# Patient Record
Sex: FEMALE | Race: WHITE
[De-identification: names, ages, dates, MRNs, and addresses within clinical notes are randomized per-mention and may not be internally consistent; named-entity substitution may affect disease eponyms.]

---

## 2020-09-10 ENCOUNTER — HOSPITAL ENCOUNTER (OUTPATIENT)
Dept: HOSPITAL 35 - CAT | Age: 55
End: 2020-09-10
Attending: INTERNAL MEDICINE
Payer: COMMERCIAL

## 2020-09-10 DIAGNOSIS — I25.10: ICD-10-CM

## 2020-09-10 DIAGNOSIS — Z13.6: Primary | ICD-10-CM

## 2020-09-10 DIAGNOSIS — E78.00: ICD-10-CM

## 2020-09-24 ENCOUNTER — HOSPITAL ENCOUNTER (OUTPATIENT)
Dept: HOSPITAL 35 - SJCVCIMAG | Age: 55
End: 2020-09-24
Attending: INTERNAL MEDICINE
Payer: COMMERCIAL

## 2020-09-24 DIAGNOSIS — I08.3: Primary | ICD-10-CM

## 2020-09-24 DIAGNOSIS — I48.0: ICD-10-CM

## 2020-09-24 DIAGNOSIS — Z82.49: ICD-10-CM

## 2020-10-12 ENCOUNTER — HOSPITAL ENCOUNTER (OUTPATIENT)
Dept: HOSPITAL 35 - LAB | Age: 55
End: 2020-10-12
Attending: INTERNAL MEDICINE
Payer: COMMERCIAL

## 2020-10-12 ENCOUNTER — HOSPITAL ENCOUNTER (OUTPATIENT)
Dept: HOSPITAL 35 - CAT | Age: 55
End: 2020-10-12
Attending: INTERNAL MEDICINE
Payer: COMMERCIAL

## 2020-10-12 DIAGNOSIS — I48.91: Primary | ICD-10-CM

## 2020-10-12 DIAGNOSIS — Z20.828: ICD-10-CM

## 2020-10-12 DIAGNOSIS — Z01.812: Primary | ICD-10-CM

## 2020-10-12 LAB
ALBUMIN SERPL-MCNC: 4.3 G/DL (ref 3.4–5)
ALT SERPL-CCNC: 24 U/L (ref 30–65)
ANION GAP SERPL CALC-SCNC: 10 MMOL/L (ref 7–16)
AST SERPL-CCNC: 19 U/L (ref 15–37)
BASOPHILS NFR BLD AUTO: 1.1 % (ref 0–2)
BILIRUB SERPL-MCNC: 0.6 MG/DL (ref 0.2–1)
BUN SERPL-MCNC: 14 MG/DL (ref 7–18)
CALCIUM SERPL-MCNC: 9.7 MG/DL (ref 8.5–10.1)
CHLORIDE SERPL-SCNC: 105 MMOL/L (ref 98–107)
CO2 SERPL-SCNC: 26 MMOL/L (ref 21–32)
CREAT SERPL-MCNC: 0.6 MG/DL (ref 0.6–1)
EOSINOPHIL NFR BLD: 2.8 % (ref 0–3)
ERYTHROCYTE [DISTWIDTH] IN BLOOD BY AUTOMATED COUNT: 13.3 % (ref 10.5–14.5)
GLUCOSE SERPL-MCNC: 95 MG/DL (ref 74–106)
GRANULOCYTES NFR BLD MANUAL: 50.7 % (ref 36–66)
HCT VFR BLD CALC: 40.9 % (ref 37–47)
HGB BLD-MCNC: 13.5 GM/DL (ref 12–15)
LYMPHOCYTES NFR BLD AUTO: 34.4 % (ref 24–44)
MCH RBC QN AUTO: 27.9 PG (ref 26–34)
MCHC RBC AUTO-ENTMCNC: 33 G/DL (ref 28–37)
MCV RBC: 84.6 FL (ref 80–100)
MONOCYTES NFR BLD: 11 % (ref 1–8)
NEUTROPHILS # BLD: 2.5 THOU/UL (ref 1.4–8.2)
PLATELET # BLD: 259 THOU/UL (ref 150–400)
POTASSIUM SERPL-SCNC: 4.2 MMOL/L (ref 3.5–5.1)
PROT SERPL-MCNC: 8 G/DL (ref 6.4–8.2)
RBC # BLD AUTO: 4.83 MIL/UL (ref 4.2–5)
SODIUM SERPL-SCNC: 141 MMOL/L (ref 136–145)
WBC # BLD AUTO: 4.9 THOU/UL (ref 4–11)

## 2020-10-16 ENCOUNTER — HOSPITAL ENCOUNTER (OUTPATIENT)
Dept: HOSPITAL 35 - CATH | Age: 55
LOS: 1 days | Discharge: HOME | End: 2020-10-17
Attending: INTERNAL MEDICINE
Payer: COMMERCIAL

## 2020-10-16 VITALS — DIASTOLIC BLOOD PRESSURE: 60 MMHG | SYSTOLIC BLOOD PRESSURE: 126 MMHG

## 2020-10-16 VITALS — SYSTOLIC BLOOD PRESSURE: 116 MMHG | DIASTOLIC BLOOD PRESSURE: 65 MMHG

## 2020-10-16 VITALS — DIASTOLIC BLOOD PRESSURE: 52 MMHG | SYSTOLIC BLOOD PRESSURE: 100 MMHG

## 2020-10-16 VITALS — DIASTOLIC BLOOD PRESSURE: 58 MMHG | SYSTOLIC BLOOD PRESSURE: 112 MMHG

## 2020-10-16 VITALS — DIASTOLIC BLOOD PRESSURE: 59 MMHG | SYSTOLIC BLOOD PRESSURE: 113 MMHG

## 2020-10-16 VITALS — DIASTOLIC BLOOD PRESSURE: 59 MMHG | SYSTOLIC BLOOD PRESSURE: 121 MMHG

## 2020-10-16 VITALS — DIASTOLIC BLOOD PRESSURE: 58 MMHG | SYSTOLIC BLOOD PRESSURE: 116 MMHG

## 2020-10-16 VITALS — WEIGHT: 225 LBS | HEIGHT: 68 IN | BODY MASS INDEX: 34.1 KG/M2

## 2020-10-16 VITALS — DIASTOLIC BLOOD PRESSURE: 68 MMHG | SYSTOLIC BLOOD PRESSURE: 98 MMHG

## 2020-10-16 DIAGNOSIS — Z88.0: ICD-10-CM

## 2020-10-16 DIAGNOSIS — Z79.01: ICD-10-CM

## 2020-10-16 DIAGNOSIS — Z98.890: ICD-10-CM

## 2020-10-16 DIAGNOSIS — E05.00: ICD-10-CM

## 2020-10-16 DIAGNOSIS — Z79.899: ICD-10-CM

## 2020-10-16 DIAGNOSIS — I48.91: Primary | ICD-10-CM

## 2020-10-16 DIAGNOSIS — Z88.2: ICD-10-CM

## 2020-10-16 LAB
ALBUMIN SERPL-MCNC: 4.3 G/DL (ref 3.4–5)
ALT SERPL-CCNC: 23 U/L (ref 30–65)
ANION GAP SERPL CALC-SCNC: 10 MMOL/L (ref 7–16)
APTT BLD: 26.8 SECONDS (ref 24.5–32.8)
AST SERPL-CCNC: 23 U/L (ref 15–37)
BILIRUB SERPL-MCNC: 0.9 MG/DL (ref 0.2–1)
BUN SERPL-MCNC: 13 MG/DL (ref 7–18)
CALCIUM SERPL-MCNC: 9.8 MG/DL (ref 8.5–10.1)
CHLORIDE SERPL-SCNC: 103 MMOL/L (ref 98–107)
CO2 SERPL-SCNC: 28 MMOL/L (ref 21–32)
CREAT SERPL-MCNC: 0.8 MG/DL (ref 0.6–1)
EOSINOPHIL NFR BLD: 3 % (ref 0–3)
ERYTHROCYTE [DISTWIDTH] IN BLOOD BY AUTOMATED COUNT: 13.2 % (ref 10.5–14.5)
GLUCOSE SERPL-MCNC: 98 MG/DL (ref 74–106)
GRANULOCYTES NFR BLD MANUAL: 52 % (ref 36–66)
HCT VFR BLD CALC: 41.5 % (ref 37–47)
HGB BLD-MCNC: 13.9 GM/DL (ref 12–15)
INR PPP: 1
LYMPHOCYTES NFR BLD AUTO: 35 % (ref 24–44)
MCH RBC QN AUTO: 28 PG (ref 26–34)
MCHC RBC AUTO-ENTMCNC: 33.5 G/DL (ref 28–37)
MCV RBC: 83.8 FL (ref 80–100)
MONOCYTES NFR BLD: 10 % (ref 1–8)
NEUTROPHILS # BLD: 2.7 THOU/UL (ref 1.4–8.2)
PLATELET # BLD: 276 THOU/UL (ref 150–400)
POTASSIUM SERPL-SCNC: 4.2 MMOL/L (ref 3.5–5.1)
PROT SERPL-MCNC: 7.9 G/DL (ref 6.4–8.2)
PROTHROMBIN TIME: 10.4 SECONDS (ref 9.3–11.4)
RBC # BLD AUTO: 4.95 MIL/UL (ref 4.2–5)
SODIUM SERPL-SCNC: 141 MMOL/L (ref 136–145)
WBC # BLD AUTO: 5.2 THOU/UL (ref 4–11)

## 2020-10-16 PROCEDURE — 65020: CPT

## 2020-10-16 PROCEDURE — 70005: CPT

## 2020-10-16 PROCEDURE — 65040: CPT

## 2020-10-16 PROCEDURE — 62900: CPT

## 2020-10-16 PROCEDURE — 62110: CPT

## 2020-10-16 PROCEDURE — 10797: CPT

## 2020-10-16 NOTE — NUR
PT. ARRIVED AT FLOOR AROUND 1515; PT. AOX4; VS WNL; SR ON THE MONITOR;
EDUCATED ABOUT BED REST AND MANTAINING LEG STRAIGH; ST. UNDERSTANDING; C/O
PAIN OVER R. GROIN INCISION; PRN PAIN MEDICATION GIVEN; RE-ASSESSMENT PT.
RESTING WITH EYES CLOSED; THROUGH THE AFTERNOON NO HEMATOMA NOTICED OVER R.
GROIN SIDE; EDUCATED ABOUT CALLING IF FEELS PAIN OR NOTICED SOME SWELLING; ST.
UNDERSTANDING; PER DR. ARTEAGA D/C LOCKED AFTER THREE HOURS; BED REST UNTIL
2030; [facility]. UNDERSTANDING; MONITORING; ADMISSION PERFORMED; ASSESSMENT AS
CHARGED; FOLLOWING POC; WILL PASS ON REPORT;

## 2020-10-17 VITALS — SYSTOLIC BLOOD PRESSURE: 113 MMHG | DIASTOLIC BLOOD PRESSURE: 56 MMHG

## 2020-10-17 VITALS — DIASTOLIC BLOOD PRESSURE: 59 MMHG | SYSTOLIC BLOOD PRESSURE: 119 MMHG

## 2020-10-17 VITALS — DIASTOLIC BLOOD PRESSURE: 56 MMHG | SYSTOLIC BLOOD PRESSURE: 113 MMHG

## 2020-10-19 NOTE — P
[facility]
[address]
[place]   [address]                     PROCEDURE REPORT              
_______________________________________________________________________________
 
Name:       BENIGNO DEAN             Room #:                     DEP [facility] 
JENNIFER#:      [id number]                       Account #:      [id number]  
Admission:  10/16/20    Attend Phys:    Armando Epstein MD
Discharge:  10/17/20    Date of Birth:  [birth year]  
                                                          Report #: [id number]
                                                                    [id number]   
_______________________________________________________________________________
THIS REPORT FOR:  
 
cc:  Nba Merlos MD, Douglas MD Couchonnal,Armando GRAYSON MD                                        ~
CC: Nba Epstein
 
DATE OF SERVICE:  10/16/2020
 
 
PREOPERATIVE DIAGNOSIS:  Atrial fibrillation.
 
POSTOPERATIVE DIAGNOSIS:  Atrial fibrillation.
 
PROCEDURES PERFORMED:
1.  Atrial fibrillation ablation, CPT code 36270.
2.  3D mapping, CPT code 24026.
3.  Intracardiac echo, CPT code 54600.
 
HISTORY:  The patient is a 55-year-old with history of recurrent paroxysmal
atrial fibrillation, here for ablation.
 
ANESTHESIA:  The patient underwent general anesthesia with no anesthesia related
complications.
 
DESCRIPTION OF PROCEDURE:  The patient underwent informed consent.  We discussed
the details of the procedure including the risks, which include but not limited
to bleeding, infection, vascular damage, stroke, MI as well as cardiac
perforation.  She understood these risks and is willing to proceed.
 
The patient was brought to EP laboratory in fasting and sedated state, prepped
and draped in a sterile fashion.  I obtained access to the right femoral vein x
3, placing an 8, 9 and 7-Thai short sheath using the modified Seldinger
technique.  Next, under fluoroscopy, decapolar catheter was placed easily in the
coronary sinus and ICE catheter was placed in the right atrium.  Using
intracardiac ultrasound, I created a 3D geometry of the left atrium.  The
patient had evidence of two left and two right pulmonary veins.  Her cardiac CT
scan showed that the right inferior pulmonary vein had 3 branches, which likely
contributed to the isolation of this vein being difficult.  Next, the patient
was systemically heparinized and transseptal was performed with the SL1 sheath
and Norwood needle, which was straightforward and then I exchanged for the cryo
sheath and placed the Lasso catheter in the left atrium and created a detailed
3D voltage map of the left atrium.  Next, we started by isolating the pulmonary
veins.  I first started by trying to isolate the left inferior pulmonary vein. 
I performed 2, 4-minute freezes and had very good pressure waveform, but we did
 
 
 
[facility]
[address]
[place]   [address]                     PROCEDURE REPORT              
_______________________________________________________________________________
 
Name:       CRISTIANASHANTIBENIGNO             Room #:                     DEP [facility] 
JENNIFER#:      [id number]                       Account #:      [id number]  
Admission:  10/16/20    Attend Phys:    Armando Epstein MD
Discharge:  10/17/20    Date of Birth:  [birth year]  
                                                          Report #: [id number]
                                                                    [id number]   
_______________________________________________________________________________
not get isolation.  I performed a third freeze that was lower and again this was
of 3 minutes duration, it did not result in isolation.  I therefore turned my
attention to the left superior pulmonary vein.  This vein underwent a 4-minute
freeze followed by 3-minute freeze.  The first freeze resulted in isolation in
41 seconds.  I then went to the right superior pulmonary vein.  I performed a
4-minute freeze, which did not result in isolation and I then performed a second
freeze of 270 seconds duration, which resulted in isolation at 150 seconds.  I
then went to the right inferior pulmonary vein and performed multiple freezes in
this vessel.  I performed 3 freezes with no isolation, but on the fourth freeze,
we had isolation in 59 seconds.  I performed another 4-minute freeze much lower
and this resulted in isolation within 31 seconds, but then I performed a repeat
voltage map of the entire left atrium and we had reconnection of the right
inferior pulmonary vein and the left inferior pulmonary vein.  Therefore, at
this point, I turned my attention to the left inferior pulmonary vein.  I
performed a 4-minute and a 3-minute freeze in this vessel and this time these
freezes were much lower almost below the left inferior pulmonary vein.  The
initial freeze resulted in isolation within 40 seconds.  I then went back to the
right inferior pulmonary vein and performed a 4-minute freeze, which resulted in
isolation, but it then reconnected during __.  I performed another 140 second
freeze, which did not result in isolation, although it had a good pressure
waveform and I at this point, was using contrast to do injections.  I therefore
performed a very low freeze almost a nonselective freeze of the right inferior
pulmonary vein.  This freeze was of 270 seconds duration and resulted in
isolation 29 seconds.  As such, all veins were now isolated.  There were no
other arrhythmias during the procedure and the procedure was concluded.  The
patient was in sinus rhythm throughout the procedure.  Using intracardiac
ultrasound, I verified there was no pericardial effusion.  The patient then
received systemic protamine and catheters and sheaths were pulled and a
figure-of-eight suture was performed in the right groin.  There were no
procedure related complications.
 
CONCLUSIONS:  Successful AFib ablation with isolation of the pulmonary veins.
 
 
 
 
 
 
 
 
 
 
 
 
  <ELECTRONICALLY SIGNED>
   By: Armando Epstein MD        
  10/19/20     1357
D: 10/16/20 1549                           _____________________________________
T: 10/16/20 1831                           Armando Epstein MD          /nt

## 2020-12-03 ENCOUNTER — HOSPITAL ENCOUNTER (OUTPATIENT)
Dept: HOSPITAL 35 - SJCVCIMAG | Age: 55
End: 2020-12-03
Attending: INTERNAL MEDICINE
Payer: COMMERCIAL

## 2020-12-03 DIAGNOSIS — I08.1: Primary | ICD-10-CM

## 2021-08-02 NOTE — NUR
pt r groin remains soft and dressing cdi, c/o ha tylenol given  this am, vss,
up voiding in br after lozano removed, hopes to go home this am, will con't to
monitor per ppoc. Quality 226: Preventive Care And Screening: Tobacco Use: Screening And Cessation Intervention: Tobacco Screening not Performed for Medical Reasons Detail Level: Detailed